# Patient Record
Sex: MALE | Race: WHITE | NOT HISPANIC OR LATINO | Employment: OTHER | URBAN - METROPOLITAN AREA
[De-identification: names, ages, dates, MRNs, and addresses within clinical notes are randomized per-mention and may not be internally consistent; named-entity substitution may affect disease eponyms.]

---

## 2017-02-27 DIAGNOSIS — G45.9 TRANSIENT CEREBRAL ISCHEMIA: ICD-10-CM

## 2017-02-27 DIAGNOSIS — I10 ESSENTIAL HYPERTENSION: ICD-10-CM

## 2017-03-01 RX ORDER — LISINOPRIL 40 MG/1
TABLET ORAL
Qty: 90 TABLET | Refills: 3 | Status: SHIPPED | OUTPATIENT
Start: 2017-03-01 | End: 2017-11-10 | Stop reason: SDUPTHER

## 2017-03-01 RX ORDER — ATORVASTATIN CALCIUM 80 MG/1
TABLET, FILM COATED ORAL
Qty: 90 TABLET | Refills: 3 | Status: SHIPPED | OUTPATIENT
Start: 2017-03-01 | End: 2017-11-10 | Stop reason: SDUPTHER

## 2017-07-21 ENCOUNTER — HOSPITAL ENCOUNTER (EMERGENCY)
Facility: HOSPITAL | Age: 72
Discharge: HOME | End: 2017-07-21
Attending: EMERGENCY MEDICINE
Payer: MEDICARE

## 2017-07-21 VITALS
RESPIRATION RATE: 18 BRPM | HEART RATE: 94 BPM | TEMPERATURE: 98 F | WEIGHT: 160 LBS | BODY MASS INDEX: 24.25 KG/M2 | OXYGEN SATURATION: 97 % | SYSTOLIC BLOOD PRESSURE: 115 MMHG | HEIGHT: 68 IN | DIASTOLIC BLOOD PRESSURE: 74 MMHG

## 2017-07-21 DIAGNOSIS — R42 DIZZINESS: Primary | ICD-10-CM

## 2017-07-21 LAB
ALBUMIN SERPL BCP-MCNC: 4.2 G/DL
ALP SERPL-CCNC: 73 U/L
ALT SERPL W/O P-5'-P-CCNC: 32 U/L
ANION GAP SERPL CALC-SCNC: 11 MMOL/L
ANISOCYTOSIS BLD QL SMEAR: SLIGHT
AST SERPL-CCNC: 21 U/L
BASOPHILS # BLD AUTO: ABNORMAL K/UL
BASOPHILS NFR BLD: 0 %
BILIRUB SERPL-MCNC: 0.5 MG/DL
BUN SERPL-MCNC: 17 MG/DL
CALCIUM SERPL-MCNC: 9.4 MG/DL
CHLORIDE SERPL-SCNC: 101 MMOL/L
CO2 SERPL-SCNC: 25 MMOL/L
CREAT SERPL-MCNC: 0.99 MG/DL
DIFFERENTIAL METHOD: ABNORMAL
EOSINOPHIL # BLD AUTO: ABNORMAL K/UL
EOSINOPHIL NFR BLD: 1 %
ERYTHROCYTE [DISTWIDTH] IN BLOOD BY AUTOMATED COUNT: 15 %
EST. GFR  (AFRICAN AMERICAN): >60 ML/MIN/1.73 M^2
EST. GFR  (NON AFRICAN AMERICAN): >60 ML/MIN/1.73 M^2
GIANT PLATELETS BLD QL SMEAR: PRESENT
GLUCOSE SERPL-MCNC: 134 MG/DL
HCT VFR BLD AUTO: 39.5 %
HGB BLD-MCNC: 13.6 G/DL
LYMPHOCYTES # BLD AUTO: ABNORMAL K/UL
LYMPHOCYTES NFR BLD: 18 %
MCH RBC QN AUTO: 31.6 PG
MCHC RBC AUTO-ENTMCNC: 34.4 G/DL
MCV RBC AUTO: 92 FL
MONOCYTES # BLD AUTO: ABNORMAL K/UL
MONOCYTES NFR BLD: 10 %
NEUTROPHILS NFR BLD: 69 %
NEUTS BAND NFR BLD MANUAL: 2 %
PLATELET # BLD AUTO: 513 K/UL
PLATELET BLD QL SMEAR: ABNORMAL
PMV BLD AUTO: 11.6 FL
POCT GLUCOSE: 134 MG/DL (ref 70–110)
POLYCHROMASIA BLD QL SMEAR: ABNORMAL
POTASSIUM SERPL-SCNC: 4.4 MMOL/L
PROT SERPL-MCNC: 7 G/DL
RBC # BLD AUTO: 4.3 M/UL
SODIUM SERPL-SCNC: 137 MMOL/L
SPHEROCYTES BLD QL SMEAR: ABNORMAL
TROPONIN I SERPL DL<=0.01 NG/ML-MCNC: <0.012 NG/ML
WBC # BLD AUTO: 17.85 K/UL

## 2017-07-21 PROCEDURE — 85007 BL SMEAR W/DIFF WBC COUNT: CPT

## 2017-07-21 PROCEDURE — 84484 ASSAY OF TROPONIN QUANT: CPT

## 2017-07-21 PROCEDURE — 80053 COMPREHEN METABOLIC PANEL: CPT

## 2017-07-21 PROCEDURE — 99284 EMERGENCY DEPT VISIT MOD MDM: CPT | Mod: 25

## 2017-07-21 PROCEDURE — 85027 COMPLETE CBC AUTOMATED: CPT

## 2017-07-21 PROCEDURE — 82962 GLUCOSE BLOOD TEST: CPT

## 2017-07-21 NOTE — ED PROVIDER NOTES
"Encounter Date: 7/21/2017       History     Chief Complaint   Patient presents with    Dizziness     "dizzy spells" that last 2-3 minutes intermittently that began 1 week ago, denies cp      HPI   Fred Samson is a 71 y.o. male who has a past medical history of Claudication of calf muscles; Hypertension; PAD (peripheral artery disease); and TIA (transient ischemic attack). who presents to the Emergency Department with dizziness. Symptoms began about a week ago. Pt describes lightheadedness, but no feeling of near syncope. He states his vision is all over the place and hard to focus. Symptoms last about 2-3 minutes and subside either spontaneously or with rest. Dizziness is elicited mostly with standing, exertion. He denies feeling these symptoms while sitting down or laying down. No change with head movement only. No assoc chest pain, palpitations, nausea. Pt states that he has blockages in his neck. Pt has a past surgical history that includes Peripheral Vascular Intervention.      Review of patient's allergies indicates:  No Known Allergies  Past Medical History:   Diagnosis Date    Claudication of calf muscles     Hypertension     PAD (peripheral artery disease)     TIA (transient ischemic attack)      Past Surgical History:   Procedure Laterality Date    Peripheral Vascular Intervention       History reviewed. No pertinent family history.  Social History   Substance Use Topics    Smoking status: Current Every Day Smoker     Packs/day: 0.50    Smokeless tobacco: Never Used    Alcohol use Not on file     Review of Systems   Constitutional: Positive for activity change. Negative for fatigue and fever.   HENT: Negative for sore throat.    Eyes: Positive for visual disturbance. Negative for photophobia and pain.   Respiratory: Negative for shortness of breath.    Cardiovascular: Negative for chest pain and palpitations.   Gastrointestinal: Negative for abdominal pain, nausea and vomiting.   Genitourinary: " Negative for dysuria.   Musculoskeletal: Negative for back pain.   Skin: Negative for rash.   Neurological: Positive for dizziness and light-headedness. Negative for weakness and headaches.   Hematological: Does not bruise/bleed easily.   Psychiatric/Behavioral: The patient is not nervous/anxious.        Physical Exam     Initial Vitals [07/21/17 1838]   BP Pulse Resp Temp SpO2   (!) 127/51 102 20 97.9 °F (36.6 °C) 97 %      MAP       76.33         Physical Exam    Nursing note and vitals reviewed.  Constitutional: He appears well-developed and well-nourished. He is not diaphoretic. No distress.   HENT:   Head: Normocephalic and atraumatic.   Mouth/Throat: Oropharynx is clear and moist.   Eyes: Conjunctivae are normal. Pupils are equal, round, and reactive to light.   No nystagmus.   Neck: Normal range of motion. Neck supple.   Cardiovascular: Normal rate, regular rhythm and normal heart sounds.   No murmur heard.  Right carotid bruit?   Pulmonary/Chest: Breath sounds normal. No respiratory distress. He has no wheezes. He has no rhonchi. He has no rales.   Abdominal: Soft. Bowel sounds are normal. He exhibits no distension. There is no tenderness.   Musculoskeletal: Normal range of motion. He exhibits no edema or tenderness.   Lymphadenopathy:     He has no cervical adenopathy.   Neurological: He is alert and oriented to person, place, and time. He has normal strength.   Negative nystagmus, negative test of skew.    Negative pronator drift, negative finger-nose, negative heel to shin, negative dysdiadochokinesia    Intention tremor bilaterally   Skin: Skin is warm and dry. Capillary refill takes less than 2 seconds. No rash noted. No erythema.   Psychiatric: He has a normal mood and affect. His behavior is normal. Judgment and thought content normal.         ED Course   Procedures  Labs Reviewed   CBC W/ AUTO DIFFERENTIAL - Abnormal; Notable for the following:        Result Value    WBC 17.85 (*)     RBC 4.30 (*)      Hemoglobin 13.6 (*)     Hematocrit 39.5 (*)     MCH 31.6 (*)     RDW 15.0 (*)     Platelets 513 (*)     Platelet Estimate Increased (*)     All other components within normal limits   COMPREHENSIVE METABOLIC PANEL - Abnormal; Notable for the following:     Glucose 134 (*)     All other components within normal limits   POCT GLUCOSE - Abnormal; Notable for the following:     POCT Glucose 134 (*)     All other components within normal limits   TROPONIN I             Medical Decision Making:   Initial Assessment:   This is an emergent evaluation of a 71 y.o.male patient with presentation of dizziness, worse with standing.   Initial differentials include but are not limited to: Carotid stenosis, aortic stenosis, orthostatic hypotension, arrhythmia, less likely vertigo.   Plan: Basic labs, troponin, orthostatics, EKG, carotid Doppler/ultrasound chest x-ray and cardiac monitoring,    Clinical Tests:   Lab Tests: Ordered and Reviewed  Radiological Study: Ordered and Reviewed  Medical Tests: Ordered and Reviewed    Workup shows stable leukocytosis.  Negative troponin.  Negative orthostatics.  Carotid Doppler shows 50-69% stenosis bilaterally, with right being greater than left.  This is consistent with my clinical picture of a carotid bruit on the right.  I believe the patient can be discharged home at this time with outpatient follow-up with Dr. Allison, the pt's cardiologist.     Results, clinical impression and rx discussed with patient.  Pt is to call for follow up with cardiologist in 2-3 days or return to the ED for any new or worsening symptoms, or for any other concerns. Return precautions given. Pt expressed understanding.                    ED Course   Value Comment By Time   BP: (!) 127/51 (Reviewed) Loy Wallace MD 07/21 1839   Temp: 97.9 °F (36.6 °C) (Reviewed) Loy Wallace MD 07/21 1839   Pulse: 102 (Reviewed) Loy Wallace MD 07/21 1839   Resp: 20 (Reviewed) Loy Wallace MD 07/21 1839   SpO2: 97 %  (Reviewed) Loy Wallace MD 07/21 1839    EKG: NSR at 96 bpm, LAD, nl intervals, no hypertrophy, no ST-T changes as read by me. Loy Wallace MD 07/21 1839   WBC: (!) 17.85 (Reviewed) Loy Wallace MD 07/21 2033   Hemoglobin: (!) 13.6 (Reviewed) Loy Wallace MD 07/21 2033     Clinical Impression:   The encounter diagnosis was Dizziness.                           Loy Wallace MD  07/21/17 2116

## 2017-07-24 ENCOUNTER — TELEPHONE (OUTPATIENT)
Dept: CARDIOLOGY | Facility: CLINIC | Age: 72
End: 2017-07-24

## 2017-07-24 DIAGNOSIS — I73.9 PAD (PERIPHERAL ARTERY DISEASE): ICD-10-CM

## 2017-07-24 DIAGNOSIS — G45.8 OTHER SPECIFIED TRANSIENT CEREBRAL ISCHEMIAS: Primary | ICD-10-CM

## 2017-07-24 NOTE — TELEPHONE ENCOUNTER
----- Message from Pat Graham sent at 7/24/2017  9:34 AM CDT -----  Patient's wife, Lolita, called.  No. [phone]    Patient went to the ER because of dizziness.  He was told it was from the blockages in his neck.   He needs an appointment this week at any location.   Patient is unable to come in today.

## 2017-07-25 NOTE — TELEPHONE ENCOUNTER
Spoke to patient and wife.     They will call this morning to schedule cartoid and echocardiogram.

## 2017-07-26 ENCOUNTER — HOSPITAL ENCOUNTER (OUTPATIENT)
Dept: RADIOLOGY | Facility: HOSPITAL | Age: 72
Discharge: HOME | End: 2017-07-26
Attending: INTERNAL MEDICINE
Payer: MEDICARE

## 2017-07-26 DIAGNOSIS — G45.8 OTHER SPECIFIED TRANSIENT CEREBRAL ISCHEMIAS: ICD-10-CM

## 2017-07-26 DIAGNOSIS — I73.9 PAD (PERIPHERAL ARTERY DISEASE): ICD-10-CM

## 2017-07-26 PROCEDURE — 93925 LOWER EXTREMITY STUDY: CPT | Mod: TC,PO

## 2017-08-02 ENCOUNTER — TELEPHONE (OUTPATIENT)
Dept: CARDIOLOGY | Facility: CLINIC | Age: 72
End: 2017-08-02

## 2017-08-02 NOTE — TELEPHONE ENCOUNTER
----- Message from Jon Pandey sent at 8/1/2017  3:57 PM CDT -----  Contact: pt's wife  Patient would like to get test results.  Name of test (lab, mammo, etc.):  U/S  Date of test:  7-21-17 and 7-26-17  Ordering provider: Kelley  Where was the test performed:  [facility]  Comments:  Pt's wife is calling to get the results of his U/S  And pt can not go back to work until Dr Benson clears him .  Pt is getting anxious he's ready o go back to work

## 2017-08-02 NOTE — TELEPHONE ENCOUNTER
No answer.    Dr. Benson did not give patient restrictions for him to be out from work. He has a follow up appointment on 8/16. I will ask NP to review his results.

## 2017-08-03 NOTE — TELEPHONE ENCOUNTER
----- Message from DANETTE Munoz, ANP sent at 8/2/2017  8:39 PM CDT -----  He has had previous intervention to both of his LE (right and left SFA). His left leg looks like the stent has re occluded. As long as he is not having leg pain at rest, has normal sensation and no coolness to his extremities then I think his appt on 8/16 is fine. If there are any concerns please feel free to call me or offer him an appt sooner in the NP clinic    Iris   ----- Message -----  From: Chrissy Palomo MA  Sent: 8/2/2017   9:19 AM  To: DANETTE Munoz, ANP    Lauren,    Could you please look into his US results, and see if he needs to be seen sooner than 8/16?    Thanks,    Chrissy

## 2017-08-03 NOTE — TELEPHONE ENCOUNTER
Spoke to patients wife. She states patient is doing well, they decided to wait for Dr Benson's return on 8/16.

## 2017-08-16 ENCOUNTER — OFFICE VISIT (OUTPATIENT)
Dept: CARDIOLOGY | Facility: CLINIC | Age: 72
End: 2017-08-16
Payer: MEDICARE

## 2017-08-16 VITALS
WEIGHT: 155 LBS | BODY MASS INDEX: 22.19 KG/M2 | DIASTOLIC BLOOD PRESSURE: 70 MMHG | HEART RATE: 100 BPM | SYSTOLIC BLOOD PRESSURE: 136 MMHG | HEIGHT: 70 IN

## 2017-08-16 DIAGNOSIS — I70.211 ATHEROSCLEROSIS OF NATIVE ARTERY OF RIGHT LOWER EXTREMITY WITH INTERMITTENT CLAUDICATION: ICD-10-CM

## 2017-08-16 DIAGNOSIS — I73.9 PAD (PERIPHERAL ARTERY DISEASE): ICD-10-CM

## 2017-08-16 DIAGNOSIS — I77.9 CAROTID DISEASE, BILATERAL: ICD-10-CM

## 2017-08-16 DIAGNOSIS — I10 ESSENTIAL HYPERTENSION: ICD-10-CM

## 2017-08-16 DIAGNOSIS — Z72.0 TOBACCO ABUSE: Primary | ICD-10-CM

## 2017-08-16 DIAGNOSIS — R09.89 BRUIT OF RIGHT CAROTID ARTERY: ICD-10-CM

## 2017-08-16 PROCEDURE — 1126F AMNT PAIN NOTED NONE PRSNT: CPT | Mod: S$GLB,,, | Performed by: INTERNAL MEDICINE

## 2017-08-16 PROCEDURE — 99214 OFFICE O/P EST MOD 30 MIN: CPT | Mod: S$GLB,,, | Performed by: INTERNAL MEDICINE

## 2017-08-16 PROCEDURE — 3008F BODY MASS INDEX DOCD: CPT | Mod: S$GLB,,, | Performed by: INTERNAL MEDICINE

## 2017-08-16 PROCEDURE — 1159F MED LIST DOCD IN RCRD: CPT | Mod: S$GLB,,, | Performed by: INTERNAL MEDICINE

## 2017-08-16 PROCEDURE — 3075F SYST BP GE 130 - 139MM HG: CPT | Mod: S$GLB,,, | Performed by: INTERNAL MEDICINE

## 2017-08-16 PROCEDURE — 93000 ELECTROCARDIOGRAM COMPLETE: CPT | Mod: S$GLB,,, | Performed by: INTERNAL MEDICINE

## 2017-08-16 PROCEDURE — 3078F DIAST BP <80 MM HG: CPT | Mod: S$GLB,,, | Performed by: INTERNAL MEDICINE

## 2017-08-16 PROCEDURE — 99999 PR PBB SHADOW E&M-EST. PATIENT-LVL III: CPT | Mod: PBBFAC,,, | Performed by: INTERNAL MEDICINE

## 2017-08-16 NOTE — PROGRESS NOTES
Subjective:   Patient ID:  Fred Samson is a 71 y.o. male who presents for  of Hyperlipidemia; Hypertension; Peripheral Arterial Disease; and Carotid Artery Disease      HPI:     Fred Samson 71 y.o. male is here follow up and feeling well without any new complaints. He has PAD s/p bilateral SFA intervention without claudication. Currently his L SFA is occluded but he has adequate collaterals without any symptoms. He has asymptomatic bilateral carotid disease R >>L. He unfortunately continues to smoke. He is on aspirin, statin, plavix, pletal and acei for medical therapy for PAD. He presented to the ED at [facility] in 7/2017 after being overheating and fishing for several hours in the hot sun without adequate hydration and shades. He is well and back to his baseline.       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 Fernandes's office: right 0.67, left 0.69  -Arterial US (5/11/15): right CAF 50-99% stenosis, SFA occluded from mid to popliteal, left SFA mid occlusion.   -LE angio (6/3/15): left KRZYSZTOF/EIA nl, CFA 70%, pSFA 80%, mSFA 100%, 3V runofff. Right KRZYSZTOF/EIA/CFA nl, %, 3V runoff   -left prox SFA treated with 5.0 x 40 pta, distal and mid SFA treated with 6.0 x 100 and 6.0 x 60 zilver PTX      -MELISSA 8/29/2016: R 0.5 and L 0.9    - R leg angio (9/6/2016) s/p R SFA  revascularization:   Atherectomy with 1.5 mm Solid CSI Diamondback    PTA with 5.0 x 150, 5.0 x 120, and 5.0 x 100 mm  Lutonix DCB   3 vessel run off    crossed with Navicross catheter and Glide advantage wire        Arterial ultrasound 2017   L CFA disease with SFA ISR  with infrapopliteal disease   L DFA diasease   R SFA stents patent             TIA (transient ischemic attack) 2015     4/27/15. right arm numbness and right leg weakness. Right mouth drooping.   -Carotid US (5/11/15): left 60%, right without significant stenosis.       Tobacco abuse 2015               HTN (hypertension) 2015           Right Arm BP - Sittin/70  Left Arm BP - Sittin/69        LABS        Lipid panel  Lab Results   Component Value Date    CHOL 79 (L) 2016     Lab Results   Component Value Date    HDL 29 (L) 2016     Lab Results   Component Value Date    LDLCALC 41.0 (L) 2016     Lab Results   Component Value Date    TRIG 45 2016     Lab Results   Component Value Date    CHOLHDL 36.7 2016            Review of Systems   Constitution: Negative for diaphoresis, weakness, night sweats, weight gain and weight loss.   HENT: Negative for congestion.    Eyes: Negative for blurred vision, discharge and double vision.   Cardiovascular: Negative for chest pain, claudication, cyanosis, dyspnea on exertion, irregular heartbeat, leg swelling, near-syncope, orthopnea, palpitations, paroxysmal nocturnal dyspnea and syncope.   Respiratory: Negative for cough, shortness of breath and wheezing.    Endocrine: Negative for cold intolerance, heat intolerance and polyphagia.   Hematologic/Lymphatic: Negative for adenopathy and bleeding problem. Does not bruise/bleed easily.   Skin: Negative for dry skin and nail changes.   Musculoskeletal: Negative for arthritis, back pain, falls, joint pain, myalgias and neck pain.   Gastrointestinal: Negative for bloating, abdominal pain, change in bowel habit and constipation.   Genitourinary: Negative for bladder incontinence, dysuria, flank pain, genital sores and  missed menses.   Neurological: Negative for aphonia, brief paralysis, difficulty with concentration and dizziness.   Psychiatric/Behavioral: Negative for altered mental status and memory loss. The patient does not have insomnia.    Allergic/Immunologic: Negative for environmental allergies.       Objective:   Physical Exam   Constitutional: He is oriented to person, place, and time. He appears well-developed and well-nourished. He is not intubated.   HENT:   Head: Normocephalic and atraumatic.   Right Ear: External ear normal.   Left Ear: External ear normal.   Mouth/Throat: Oropharynx is clear and moist.   Eyes: Conjunctivae and EOM are normal. Pupils are equal, round, and reactive to light. Right eye exhibits no discharge. Left eye exhibits no discharge. No scleral icterus.   Neck: Normal range of motion. Neck supple. Normal carotid pulses, no hepatojugular reflux and no JVD present. Carotid bruit is not present. No tracheal deviation present. No thyromegaly present.   Cardiovascular: Normal rate, regular rhythm, S1 normal and S2 normal.   No extrasystoles are present. PMI is not displaced.  Exam reveals no gallop, no S3, no distant heart sounds, no friction rub and no midsystolic click.    No murmur heard.  Pulses:       Carotid pulses are 2+ on the right side with bruit, and 2+ on the left side.       Radial pulses are 2+ on the right side, and 2+ on the left side.        Femoral pulses are 2+ on the right side, and 2+ on the left side.       Popliteal pulses are 2+ on the right side, and 2+ on the left side.        Dorsalis pedis pulses are 0 on the right side, and 0 on the left side.        Posterior tibial pulses are 0 on the right side, and 0 on the left side.     Biphasic R DP without a R PT doppler signals      Biphasic L DP and PT doppler signals    Pulmonary/Chest: Effort normal and breath sounds normal. No accessory muscle usage or stridor. No apnea, no tachypnea and no bradypnea. He is not intubated.  No respiratory distress. He has no decreased breath sounds. He has no wheezes. He has no rales. He exhibits no tenderness and no bony tenderness.   Abdominal: He exhibits no distension, no pulsatile liver, no abdominal bruit, no ascites, no pulsatile midline mass and no mass. There is no tenderness. There is no rebound and no guarding.   Musculoskeletal: Normal range of motion. He exhibits no edema or tenderness.   Lymphadenopathy:     He has no cervical adenopathy.   Neurological: He is alert and oriented to person, place, and time. He has normal reflexes. No cranial nerve deficit. Coordination normal.   Skin: Skin is warm. No rash noted. No erythema. No pallor.   Psychiatric: He has a normal mood and affect. His behavior is normal. Judgment and thought content normal.       Assessment:     1. 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 Carotid disease, bilateral    3. PAD (peripheral artery disease)    4. Essential hypertension    5. Tobacco abuse    6. Bruit of right carotid artery        Plan:       Medical therapy for PAD and Carotid disease  Smoking cessation      Follow up in a year with ABIX and carotid ultrasound  Adequate hydration while outdoors        Continue with current medical plan and lifestyle changes.  Return sooner for concerns or questions. If symptoms persist go to the ED  I have reviewed all pertinent data on this patient       I have reviewed the patient's medical history in detail and updated the computerized patient record.    Orders Placed This Encounter   Procedures    US Carotid Bilateral     Standing Status:   Future     Standing Expiration Date:   8/16/2018    Lipid panel     fasting     Standing Status:   Future     Standing Expiration Date:   10/15/2018    Comprehensive metabolic panel     Standing Status:   Future     Standing Expiration Date:   10/15/2018    Cardiology Lab Segmental Pressures Low Ext W Stress     Standing  Status:   Future     Standing Expiration Date:   8/16/2018    EKG 12-lead       Follow up as scheduled. Return sooner for concerns or questions            He expressed verbal understanding and agreed with the plan        Patient's Medications   New Prescriptions    No medications on file   Previous Medications    ASPIRIN (ECOTRIN) 81 MG EC TABLET    Take 1 tablet (81 mg total) by mouth once daily.    ATORVASTATIN (LIPITOR) 80 MG TABLET    TAKE 1 TABLET EVERY DAY    CILOSTAZOL (PLETAL) 50 MG TAB    Take 1 tablet (50 mg total) by mouth 2 (two) times daily.    CLOPIDOGREL (PLAVIX) 75 MG TABLET    Take 1 tablet (75 mg total) by mouth once daily.    LISINOPRIL (PRINIVIL,ZESTRIL) 40 MG TABLET    TAKE 1 TABLET EVERY DAY    NICOTINE (NICODERM CQ) 21 MG/24 HR    Place 1 patch onto the skin once daily.   Modified Medications    No medications on file   Discontinued Medications    No medications on file

## 2017-08-16 NOTE — PATIENT INSTRUCTIONS
Taking Aspirin for Atherosclerosis       Aspirin is a medicine often prescribed to treat atherosclerosis. This condition affects your arteries. These are the blood vessels that carry blood away from your heart. Having atherosclerosis means youre at greater risk of a heart attack or stroke. Aspirin can help prevent these from happening.  How atherosclerosis affects your arteries   A fatty material (plaque) can build up in your arteries. This makes it harder for blood to flow through them. A blood clot can then form on the plaque. This may block the artery, cutting off blood flow. This can cause conditions such as coronary artery disease (CAD) and peripheral arterial disease (PAD):  · CAD occurs when plaque builds up in the coronary artery. This artery supplies the heart with oxygen-rich blood.  · PAD occurs when plaque forms in leg arteries.  The same things that cause CAD and PAD can also cause plaque to form in other arteries in your body, such as those in the brain. When plaque occurs in any of these arteries, it raises your risk of heart attack or stroke.  What aspirin does  Aspirin is a blood-thinner (antiplatelet medicine). It helps keep blood clots from forming. This reduces the risk of blockage. Aspirin can be taken daily if you are at high risk of or have already had a heart attack or stroke. It is also used after a procedure called a stent placement. This is when a tiny wire mesh tube, or stent, is placed in an artery to keep it open. Aspirin helps prevent blood clots from forming on the stent.  Taking aspirin safely  Tell your healthcare provider about any medicines you are taking. This includes:  · All prescription medicines  · Over-the-counter medicines  · Herbs, vitamins, and other supplements  Also mention if you have a history of ulcers or bleeding problems. Ask whether you will need to stop taking aspirin before having surgery or dental work. Always take medicines as directed.  Tips for taking  aspirin  · Have a routine. For example, take aspirin with the same meal each day.  · Dont take more than prescribed. A low dose gives the same benefit as a higher one, with a lower risk of side effects.  · Dont skip doses. Aspirin needs to be taken each day to work well.  · Keep track of what you take. A pillbox with days of the week can help, especially if you take several medicines. Or use a list or chart to keep track.  When to call your healthcare provider  Side effects of aspirin are not usually serious. If you do have problems, changing your dose may help. Call your healthcare provider if you have any of the following:  · Excessive bruising (some bruising is normal)  · Nosebleeds, bleeding gums, or other excessive bleeding  · An upset stomach or stomach pain   Date Last Reviewed: 6/1/2016  © 4258-0566 [employer] [address] [place] [address], [place] [address]. All rights reserved. This information is not intended as a substitute for professional medical care. Always follow your healthcare professional's instructions.

## 2017-08-30 ENCOUNTER — TELEPHONE (OUTPATIENT)
Dept: SMOKING CESSATION | Facility: CLINIC | Age: 72
End: 2017-08-30

## 2017-11-10 DIAGNOSIS — G45.9 TRANSIENT CEREBRAL ISCHEMIA: ICD-10-CM

## 2017-11-10 DIAGNOSIS — I10 ESSENTIAL HYPERTENSION: ICD-10-CM

## 2017-11-12 RX ORDER — LISINOPRIL 40 MG/1
TABLET ORAL
Qty: 90 TABLET | Refills: 3 | Status: SHIPPED | OUTPATIENT
Start: 2017-11-12 | End: 2018-04-04 | Stop reason: SDUPTHER

## 2017-11-12 RX ORDER — ATORVASTATIN CALCIUM 80 MG/1
TABLET, FILM COATED ORAL
Qty: 90 TABLET | Refills: 3 | Status: SHIPPED | OUTPATIENT
Start: 2017-11-12

## 2017-11-14 RX ORDER — CILOSTAZOL 50 MG/1
TABLET ORAL
Qty: 180 TABLET | Refills: 3 | Status: SHIPPED | OUTPATIENT
Start: 2017-11-14

## 2018-03-22 ENCOUNTER — TELEPHONE (OUTPATIENT)
Dept: CARDIOLOGY | Facility: CLINIC | Age: 73
End: 2018-03-22

## 2018-03-22 NOTE — TELEPHONE ENCOUNTER
----- Message from Isabela Valerie sent at 3/21/2018  5:28 PM CDT -----  Contact: Lolita, spouse, [phone]  Requests for patient to be seen as soon as possible, states he is having headaches, dizziness, passing out, and believe is due to blockages he had in the past.

## 2018-04-04 ENCOUNTER — OFFICE VISIT (OUTPATIENT)
Dept: CARDIOLOGY | Facility: CLINIC | Age: 73
End: 2018-04-04
Payer: MEDICARE

## 2018-04-04 VITALS
HEART RATE: 74 BPM | BODY MASS INDEX: 23.99 KG/M2 | SYSTOLIC BLOOD PRESSURE: 112 MMHG | DIASTOLIC BLOOD PRESSURE: 58 MMHG | WEIGHT: 144 LBS | HEIGHT: 65 IN

## 2018-04-04 DIAGNOSIS — Z72.0 TOBACCO ABUSE: ICD-10-CM

## 2018-04-04 DIAGNOSIS — I77.9 CAROTID DISEASE, BILATERAL: ICD-10-CM

## 2018-04-04 DIAGNOSIS — G45.9 TRANSIENT CEREBRAL ISCHEMIA, UNSPECIFIED TYPE: ICD-10-CM

## 2018-04-04 DIAGNOSIS — G45.8 OTHER SPECIFIED TRANSIENT CEREBRAL ISCHEMIAS: ICD-10-CM

## 2018-04-04 DIAGNOSIS — I10 ESSENTIAL HYPERTENSION: ICD-10-CM

## 2018-04-04 DIAGNOSIS — E78.2 MIXED HYPERLIPIDEMIA: ICD-10-CM

## 2018-04-04 DIAGNOSIS — I73.9 PAD (PERIPHERAL ARTERY DISEASE): ICD-10-CM

## 2018-04-04 DIAGNOSIS — R09.89 CAROTID BRUIT, UNSPECIFIED LATERALITY: ICD-10-CM

## 2018-04-04 DIAGNOSIS — I70.211 ATHEROSCLEROSIS OF NATIVE ARTERY OF RIGHT LOWER EXTREMITY WITH INTERMITTENT CLAUDICATION: Primary | ICD-10-CM

## 2018-04-04 PROCEDURE — 3074F SYST BP LT 130 MM HG: CPT | Mod: CPTII,S$GLB,, | Performed by: INTERNAL MEDICINE

## 2018-04-04 PROCEDURE — 3078F DIAST BP <80 MM HG: CPT | Mod: CPTII,S$GLB,, | Performed by: INTERNAL MEDICINE

## 2018-04-04 PROCEDURE — 99215 OFFICE O/P EST HI 40 MIN: CPT | Mod: S$GLB,,, | Performed by: INTERNAL MEDICINE

## 2018-04-04 RX ORDER — LISINOPRIL 40 MG/1
20 TABLET ORAL DAILY
Qty: 90 TABLET | Refills: 3
Start: 2018-04-04

## 2018-04-04 NOTE — PROGRESS NOTES
Subjective:   Patient ID:  Fred Samson is a 72 y.o. male who presents for  of Claudication; Peripheral Arterial Disease; Hyperlipidemia; Hypertension; and Nicotine Dependence      HPI:     Fred Samson 72 y.o. male is here follow up and complaining of dizziness. He takes lisinopril 40 mg daily with /62 mmHg. Dizziness occurs mostly taking his medications in the morning.       He has PAD s/p bilateral SFA intervention. He has amari IIb claudication walking on a ramp. He walk 3.5 blocks on a flat surface, amari IIb.     He has a known L SFA ISR  as of 7/2017. He has asymptomatic bilateral carotid disease R >>L. He unfortunately continues to smoke. He is on aspirin, statin, plavix, pletal and acei for medical therapy for PAD.         I reviewed his recent carotid and leg ultrasound with him           Patient Active Problem List    Diagnosis Date Noted    Atherosclerosis of native artery of right lower extremity with intermittent claudication 09/06/2016     Priority: High    Carotid disease, bilateral 08/16/2017        R ICA PSV  221 cm/sec   L ICA  cm/sec            Bruit of right carotid artery 08/16/2017    Hyperlipidemia 09/06/2016    Right leg claudication 08/26/2016    PAD (peripheral artery disease) 05/29/2015     -MELISSA (5/18/15) @ dr. Fernandes's office: right 0.67, left 0.69  -Arterial US (5/11/15): right CAF 50-99% stenosis, SFA occluded from mid to popliteal, left SFA mid occlusion.   -LE angio (6/3/15): left KRZYSZTOF/EIA nl, CFA 70%, pSFA 80%, mSFA 100%, 3V runofff. Right KRZYSZTOF/EIA/CFA nl, %, 3V runoff   -left prox SFA treated with 5.0 x 40 pta, distal and mid SFA treated with 6.0 x 100 and 6.0 x 60 zilver PTX      -MELISSA 8/29/2016: R 0.5 and L 0.9    - R leg angio (9/6/2016) s/p R SFA  revascularization:   Atherectomy with 1.5 mm Solid CSI Diamondback    PTA with 5.0 x 150, 5.0 x 120, and 5.0 x 100 mm Lutonix DCB   3 vessel run off    crossed with Navicross catheter and  Glide advantage wire        Arterial ultrasound 2017   L CFA disease with SFA ISR  with infrapopliteal disease   L DFA diasease   R SFA stents patent             TIA (transient ischemic attack) 2015     4/27/15. right arm numbness and right leg weakness. Right mouth drooping.   -Carotid US (5/11/15): left 60%, right without significant stenosis.       Tobacco abuse 2015               HTN (hypertension) 2015           Right Arm BP - Sittin/62  Left Arm BP - Sittin/58        LABS        Lipid panel  Lab Results   Component Value Date    CHOL 79 (L) 2016     Lab Results   Component Value Date    HDL 29 (L) 2016     Lab Results   Component Value Date    LDLCALC 41.0 (L) 2016     Lab Results   Component Value Date    TRIG 45 2016     Lab Results   Component Value Date    CHOLHDL 36.7 2016            Review of Systems   Constitution: Negative for diaphoresis, weakness, night sweats, weight gain and weight loss.   HENT: Negative for congestion.    Eyes: Negative for blurred vision, discharge and double vision.   Cardiovascular: Negative for chest pain, claudication, cyanosis, dyspnea on exertion, irregular heartbeat, leg swelling, near-syncope, orthopnea, palpitations, paroxysmal nocturnal dyspnea and syncope.   Respiratory: Negative for cough, shortness of breath and wheezing.    Endocrine: Negative for cold intolerance, heat intolerance and polyphagia.   Hematologic/Lymphatic: Negative for adenopathy and bleeding problem. Does not bruise/bleed easily.   Skin: Negative for dry skin and nail changes.   Musculoskeletal: Negative for arthritis, back pain, falls, joint pain, myalgias and neck pain.   Gastrointestinal: Negative for bloating, abdominal pain, change in bowel habit and constipation.   Genitourinary: Negative for bladder incontinence, dysuria, flank pain, genital sores and missed menses.   Neurological: Negative for aphonia, brief paralysis,  difficulty with concentration and dizziness.   Psychiatric/Behavioral: Negative for altered mental status and memory loss. The patient does not have insomnia.    Allergic/Immunologic: Negative for environmental allergies.       Objective:   Physical Exam   Constitutional: He is oriented to person, place, and time. He appears well-developed and well-nourished. He is not intubated.   HENT:   Head: Normocephalic and atraumatic.   Right Ear: External ear normal.   Left Ear: External ear normal.   Mouth/Throat: Oropharynx is clear and moist.   Eyes: Conjunctivae and EOM are normal. Pupils are equal, round, and reactive to light. Right eye exhibits no discharge. Left eye exhibits no discharge. No scleral icterus.   Neck: Normal range of motion. Neck supple. Normal carotid pulses, no hepatojugular reflux and no JVD present. Carotid bruit is not present. No tracheal deviation present. No thyromegaly present.   Cardiovascular: Normal rate, regular rhythm, S1 normal and S2 normal.   No extrasystoles are present. PMI is not displaced.  Exam reveals no gallop, no S3, no distant heart sounds, no friction rub and no midsystolic click.    No murmur heard.  Pulses:       Carotid pulses are 2+ on the right side with bruit, and 2+ on the left side.       Radial pulses are 2+ on the right side, and 2+ on the left side.        Femoral pulses are 2+ on the right side, and 2+ on the left side.       Popliteal pulses are 2+ on the right side, and 2+ on the left side.        Dorsalis pedis pulses are 0 on the right side, and 0 on the left side.        Posterior tibial pulses are 0 on the right side, and 0 on the left side.     Monophasic R DP with a faint R PT doppler signals      Monophasic L DP and biphasic L PT doppler signals    Pulmonary/Chest: Effort normal and breath sounds normal. No accessory muscle usage or stridor. No apnea, no tachypnea and no bradypnea. He is not intubated. No respiratory distress. He has no decreased breath  sounds. He has no wheezes. He has no rales. He exhibits no tenderness and no bony tenderness.   Abdominal: He exhibits no distension, no pulsatile liver, no abdominal bruit, no ascites, no pulsatile midline mass and no mass. There is no tenderness. There is no rebound and no guarding.   Musculoskeletal: Normal range of motion. He exhibits no edema or tenderness.   Lymphadenopathy:     He has no cervical adenopathy.   Neurological: He is alert and oriented to person, place, and time. He has normal reflexes. No cranial nerve deficit. Coordination normal.   Skin: Skin is warm. No rash noted. No erythema. No pallor.     No ulcers         Psychiatric: He has a normal mood and affect. His behavior is normal. Judgment and thought content normal.       Assessment:     1. 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 Other specified transient cerebral ischemias    3. PAD (peripheral artery disease)    4. Essential hypertension    5. Tobacco abuse    6. Carotid disease, bilateral    7. Mixed hyperlipidemia    8. Carotid bruit, unspecified laterality    9. Transient cerebral ischemia, unspecified type        Plan:       Medical therapy for PAD and Carotid disease for now  Smoking cessation      Reduce lisinopril to 20 mg and take it at night  If dizziness persists he will reduce the dose to 10 mg        Labs+ arterial US+ carotid US  Follow up after tests         Adequate hydration         Continue with current medical plan and lifestyle changes.  Return sooner for concerns or questions. If symptoms persist go to the ED  I have reviewed all pertinent data on this patient       I have reviewed the patient's medical history in detail and updated the computerized patient record.    Orders Placed This Encounter   Procedures    US Lower Extremity Arteries Bilateral     Standing Status:   Future     Standing Expiration Date:   4/4/2019    US Carotid Bilateral     Standing Status:    Future     Standing Expiration Date:   4/4/2019    CBC auto differential     Standing Status:   Future     Standing Expiration Date:   6/3/2019    Basic metabolic panel     Standing Status:   Future     Standing Expiration Date:   6/3/2019    Lipid panel     Standing Status:   Future     Standing Expiration Date:   6/3/2019    Ambulatory referral to Smoking Cessation Program     Referral Priority:   Routine     Referral Type:   Consultation     Referral Reason:   Specialty Services Required     Requested Specialty:   CTTS     Number of Visits Requested:   1    Cardiology Lab [facility] Resting, Lower Extremities     Standing Status:   Future     Standing Expiration Date:   4/4/2019       Follow up as scheduled. Return sooner for concerns or questions            He expressed verbal understanding and agreed with the plan        Greater than 50% of the visit of 45 minutes was spent counseling, educating, and coordinating the care of the patient.        -In today's visit, at least 4 established conditions that pose a risk to life or bodily function have been addressed and the conditions are severe.    -In today's visit, monitoring for drug toxicity was accomplished.              Patient's Medications   New Prescriptions    No medications on file   Previous Medications    ASPIRIN (ECOTRIN) 81 MG EC TABLET    Take 1 tablet (81 mg total) by mouth once daily.    ATORVASTATIN (LIPITOR) 80 MG TABLET    TAKE 1 TABLET EVERY DAY    CILOSTAZOL (PLETAL) 50 MG TAB    TAKE 1 TABLET TWICE DAILY    CLOPIDOGREL (PLAVIX) 75 MG TABLET    Take 1 tablet (75 mg total) by mouth once daily.    NICOTINE (NICODERM CQ) 21 MG/24 HR    Place 1 patch onto the skin once daily.   Modified Medications    Modified Medication Previous Medication    LISINOPRIL (PRINIVIL,ZESTRIL) 40 MG TABLET lisinopril (PRINIVIL,ZESTRIL) 40 MG tablet       Take 0.5 tablets (20 mg total) by mouth once daily.    TAKE 1 TABLET EVERY DAY   Discontinued Medications    No  medications on file

## 2018-04-17 ENCOUNTER — HOSPITAL ENCOUNTER (OUTPATIENT)
Dept: RADIOLOGY | Facility: HOSPITAL | Age: 73
Discharge: HOME | End: 2018-04-17
Attending: INTERNAL MEDICINE
Payer: MEDICARE

## 2018-04-17 ENCOUNTER — HOSPITAL ENCOUNTER (OUTPATIENT)
Dept: CARDIOLOGY | Facility: HOSPITAL | Age: 73
Discharge: HOME | End: 2018-04-17
Attending: INTERNAL MEDICINE
Payer: MEDICARE

## 2018-04-17 ENCOUNTER — TELEPHONE (OUTPATIENT)
Dept: CARDIOLOGY | Facility: CLINIC | Age: 73
End: 2018-04-17

## 2018-04-17 DIAGNOSIS — I70.211 ATHEROSCLEROSIS OF NATIVE ARTERY OF RIGHT LOWER EXTREMITY WITH INTERMITTENT CLAUDICATION: ICD-10-CM

## 2018-04-17 DIAGNOSIS — R09.89 CAROTID BRUIT, UNSPECIFIED LATERALITY: ICD-10-CM

## 2018-04-17 DIAGNOSIS — I77.9 CAROTID DISEASE, BILATERAL: ICD-10-CM

## 2018-04-17 DIAGNOSIS — I73.9 PAD (PERIPHERAL ARTERY DISEASE): ICD-10-CM

## 2018-04-17 LAB — VASCULAR ANKLE BRACHIAL INDEX (ABI) LEFT: 0.49 (ref 0.9–1.2)

## 2018-04-17 PROCEDURE — 93880 EXTRACRANIAL BILAT STUDY: CPT | Mod: TC

## 2018-04-17 PROCEDURE — 93925 LOWER EXTREMITY STUDY: CPT | Mod: TC

## 2018-04-17 PROCEDURE — 93922 UPR/L XTREMITY ART 2 LEVELS: CPT

## 2018-04-17 PROCEDURE — 93925 LOWER EXTREMITY STUDY: CPT | Mod: 26,,, | Performed by: RADIOLOGY

## 2018-04-17 PROCEDURE — 93880 EXTRACRANIAL BILAT STUDY: CPT | Mod: 26,,, | Performed by: RADIOLOGY

## 2018-04-17 PROCEDURE — 93922 UPR/L XTREMITY ART 2 LEVELS: CPT | Mod: 26,,, | Performed by: INTERNAL MEDICINE

## 2018-04-17 NOTE — TELEPHONE ENCOUNTER
----- Message from Alejandro Gardner sent at 4/17/2018  4:00 PM CDT -----  Contact: [phone]  Patient will not be able to make the appt tomorrow and wanted to know if the doctor can call him with the results. Please call and advise.

## 2018-04-18 NOTE — PROGRESS NOTES
Your test shows that you have severe blockages in the arteries to your legs        Thanks        ZN

## 2018-04-18 NOTE — PROGRESS NOTES
Your carotid ultrasound revealed mild to moderate disease that we can continue to treat medically      No intervention needed at this time      Thanks        ZN

## 2018-04-18 NOTE — PROGRESS NOTES
Your ultrasound revealed a complete occlusion of the right superficial femoral artery and severe disease on the left as well      If you are symptomatic we should consider revascularization        We can discuss further during your follow up        Thanks        ZN

## 2018-04-19 ENCOUNTER — TELEPHONE (OUTPATIENT)
Dept: CARDIOLOGY | Facility: CLINIC | Age: 73
End: 2018-04-19

## 2018-04-19 NOTE — TELEPHONE ENCOUNTER
----- Message from Sana Chaudhary sent at 4/19/2018  2:59 PM CDT -----  Contact: Lolita (wife)/[phone]  Patient called again to get test results.  She said they are worried about the test and really need to know.    Please call and advise.

## 2018-04-19 NOTE — TELEPHONE ENCOUNTER
----- Message from Darshana Villagomez sent at 4/19/2018  9:23 AM CDT -----  Contact: wife/Lolita   [phone]  Pt wife is requesting to speak with you concerning the patient test results  Please call and advise

## 2018-04-19 NOTE — TELEPHONE ENCOUNTER
I have tried calling patient back a couple of times, I think they are having trouble with there phone because I get a fast busy signal.

## 2018-12-26 DIAGNOSIS — G45.9 TRANSIENT CEREBRAL ISCHEMIA, UNSPECIFIED TYPE: ICD-10-CM

## 2018-12-26 DIAGNOSIS — I10 ESSENTIAL HYPERTENSION: ICD-10-CM

## 2018-12-28 RX ORDER — LISINOPRIL 40 MG/1
TABLET ORAL
Qty: 90 TABLET | Refills: 3 | OUTPATIENT
Start: 2018-12-28